# Patient Record
(demographics unavailable — no encounter records)

---

## 2025-07-11 NOTE — DISCUSSION/SUMMARY
[Medication Risks Reviewed] : Medication risks reviewed [de-identified] : reviewed the case and the imaging with the patient  cervical spine pain - CERVICAL SPONDYLOSIS c5-7 has tried extensive PT WITHOUT RELIEF  discussion of the condition and treatment options cautions discussed questions answered discussion of natural history of the condition and what the next step would be mrI c SPINE  flexeril FU TO REVIEW THE mri
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
## 2025-07-11 NOTE — PHYSICAL EXAM
[Extension] : extension [Rotation to left] : rotation to left [Rotation to right] : rotation to right [5___] : right triceps 5[unfilled]/5 [] : no ecchymosis [de-identified] : extension 20 degrees [de-identified] : left lateral rotation 45 degrees [TWNoteComboBox6] : right lateral rotation 60 degrees

## 2025-07-11 NOTE — DISCUSSION/SUMMARY
[Medication Risks Reviewed] : Medication risks reviewed [de-identified] : reviewed the case and the imaging with the patient  cervical spine pain - CERVICAL SPONDYLOSIS c5-7 has tried extensive PT WITHOUT RELIEF  discussion of the condition and treatment options cautions discussed questions answered discussion of natural history of the condition and what the next step would be mrI c SPINE  flexeril FU TO REVIEW THE mri
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
## 2025-07-11 NOTE — PHYSICAL EXAM
[Extension] : extension [Rotation to left] : rotation to left [Rotation to right] : rotation to right [5___] : right triceps 5[unfilled]/5 [] : no ecchymosis [de-identified] : extension 20 degrees [de-identified] : left lateral rotation 45 degrees [[email or web address]] : right lateral rotation 60 degrees